# Patient Record
Sex: MALE | URBAN - NONMETROPOLITAN AREA
[De-identification: names, ages, dates, MRNs, and addresses within clinical notes are randomized per-mention and may not be internally consistent; named-entity substitution may affect disease eponyms.]

---

## 2018-07-12 ENCOUNTER — APPOINTMENT (RX ONLY)
Dept: URBAN - NONMETROPOLITAN AREA CLINIC 29 | Facility: CLINIC | Age: 48
Setting detail: DERMATOLOGY
End: 2018-07-12

## 2018-07-12 DIAGNOSIS — L91.8 OTHER HYPERTROPHIC DISORDERS OF THE SKIN: ICD-10-CM

## 2018-07-12 DIAGNOSIS — D22 MELANOCYTIC NEVI: ICD-10-CM

## 2018-07-12 PROBLEM — D22.5 MELANOCYTIC NEVI OF TRUNK: Status: ACTIVE | Noted: 2018-07-12

## 2018-07-12 PROCEDURE — 99202 OFFICE O/P NEW SF 15 MIN: CPT

## 2018-07-12 PROCEDURE — ? COUNSELING

## 2018-07-12 PROCEDURE — ? SKIN TAG REMOVAL (COSMETIC)

## 2018-07-12 ASSESSMENT — LOCATION ZONE DERM
LOCATION ZONE: NECK
LOCATION ZONE: TRUNK

## 2018-07-12 ASSESSMENT — LOCATION SIMPLE DESCRIPTION DERM
LOCATION SIMPLE: RIGHT ANTERIOR NECK
LOCATION SIMPLE: LEFT LOWER BACK
LOCATION SIMPLE: LEFT ANTERIOR NECK
LOCATION SIMPLE: POSTERIOR NECK

## 2018-07-12 ASSESSMENT — LOCATION DETAILED DESCRIPTION DERM
LOCATION DETAILED: RIGHT INFERIOR LATERAL NECK
LOCATION DETAILED: LEFT CLAVICULAR NECK
LOCATION DETAILED: RIGHT POSTERIOR NECK
LOCATION DETAILED: LEFT SUPERIOR MEDIAL LOWER BACK

## 2018-07-12 NOTE — PROCEDURE: SKIN TAG REMOVAL (COSMETIC)
Anesthesia Volume In Cc: 3
Removed With: liquid nitrogen
Detail Level: Detailed
Price (Use Numbers Only, No Special Characters Or $): 0
Consent: Written consent obtained and the risks of skin tag removal was reviewed with the patient including but not limited to bleeding, pigmentary change, infection, pain, and remote possibility of scarring.